# Patient Record
Sex: FEMALE | Race: BLACK OR AFRICAN AMERICAN | Employment: OTHER | URBAN - METROPOLITAN AREA
[De-identification: names, ages, dates, MRNs, and addresses within clinical notes are randomized per-mention and may not be internally consistent; named-entity substitution may affect disease eponyms.]

---

## 2017-04-04 ENCOUNTER — HOSPITAL ENCOUNTER (OUTPATIENT)
Dept: MAMMOGRAPHY | Age: 82
Discharge: HOME | End: 2017-04-04
Attending: INTERNAL MEDICINE
Payer: MEDICARE

## 2017-04-04 DIAGNOSIS — Z12.31 VISIT FOR SCREENING MAMMOGRAM: ICD-10-CM

## 2017-04-04 PROCEDURE — 77067 SCR MAMMO BI INCL CAD: CPT

## 2017-07-20 ENCOUNTER — HOSPITAL ENCOUNTER (OUTPATIENT)
Dept: MRI IMAGING | Age: 82
Discharge: HOME | End: 2017-07-20
Attending: INTERNAL MEDICINE
Payer: MEDICARE

## 2017-07-20 DIAGNOSIS — H53.8 BLURRED VISION: ICD-10-CM

## 2017-07-20 PROCEDURE — 70551 MRI BRAIN STEM W/O DYE: CPT

## 2018-04-17 ENCOUNTER — HOSPITAL ENCOUNTER (OUTPATIENT)
Dept: MAMMOGRAPHY | Age: 83
Discharge: HOME | End: 2018-04-17
Attending: INTERNAL MEDICINE
Payer: MEDICARE

## 2018-04-17 DIAGNOSIS — Z12.39 SCREENING BREAST EXAMINATION: ICD-10-CM

## 2018-04-17 PROCEDURE — 77067 SCR MAMMO BI INCL CAD: CPT

## 2019-01-24 ENCOUNTER — HOSPITAL ENCOUNTER (EMERGENCY)
Age: 84
Discharge: HOME | End: 2019-01-24
Attending: EMERGENCY MEDICINE
Payer: MEDICARE

## 2019-01-24 VITALS
DIASTOLIC BLOOD PRESSURE: 52 MMHG | HEART RATE: 94 BPM | WEIGHT: 172.62 LBS | RESPIRATION RATE: 16 BRPM | OXYGEN SATURATION: 98 % | BODY MASS INDEX: 30.59 KG/M2 | HEIGHT: 63 IN | SYSTOLIC BLOOD PRESSURE: 161 MMHG | TEMPERATURE: 98.2 F

## 2019-01-24 DIAGNOSIS — R04.0 EPISTAXIS: Primary | ICD-10-CM

## 2019-01-24 LAB
APTT PPP: 25.5 SEC (ref 22.1–32)
BASOPHILS # BLD: 0 K/UL (ref 0–0.1)
BASOPHILS NFR BLD: 0 % (ref 0–1)
COMMENT, HOLDF: NORMAL
DIFFERENTIAL METHOD BLD: ABNORMAL
EOSINOPHIL # BLD: 0.1 K/UL (ref 0–0.4)
EOSINOPHIL NFR BLD: 2 % (ref 0–7)
ERYTHROCYTE [DISTWIDTH] IN BLOOD BY AUTOMATED COUNT: 14.3 % (ref 11.5–14.5)
HCT VFR BLD AUTO: 26 % (ref 35–47)
HGB BLD-MCNC: 8.6 G/DL (ref 11.5–16)
IMM GRANULOCYTES # BLD AUTO: 0 K/UL (ref 0–0.04)
IMM GRANULOCYTES NFR BLD AUTO: 1 % (ref 0–0.5)
INR PPP: 1 (ref 0.9–1.1)
LYMPHOCYTES # BLD: 2.1 K/UL (ref 0.8–3.5)
LYMPHOCYTES NFR BLD: 24 % (ref 12–49)
MCH RBC QN AUTO: 31.4 PG (ref 26–34)
MCHC RBC AUTO-ENTMCNC: 33.1 G/DL (ref 30–36.5)
MCV RBC AUTO: 94.9 FL (ref 80–99)
MONOCYTES # BLD: 0.9 K/UL (ref 0–1)
MONOCYTES NFR BLD: 11 % (ref 5–13)
NEUTS SEG # BLD: 5.4 K/UL (ref 1.8–8)
NEUTS SEG NFR BLD: 63 % (ref 32–75)
NRBC # BLD: 0 K/UL (ref 0–0.01)
NRBC BLD-RTO: 0 PER 100 WBC
PLATELET # BLD AUTO: 159 K/UL (ref 150–400)
PMV BLD AUTO: 11.9 FL (ref 8.9–12.9)
PROTHROMBIN TIME: 9.8 SEC (ref 9–11.1)
RBC # BLD AUTO: 2.74 M/UL (ref 3.8–5.2)
SAMPLES BEING HELD,HOLD: NORMAL
THERAPEUTIC RANGE,PTTT: NORMAL SECS (ref 58–77)
WBC # BLD AUTO: 8.6 K/UL (ref 3.6–11)

## 2019-01-24 PROCEDURE — 85610 PROTHROMBIN TIME: CPT

## 2019-01-24 PROCEDURE — 36415 COLL VENOUS BLD VENIPUNCTURE: CPT

## 2019-01-24 PROCEDURE — 85730 THROMBOPLASTIN TIME PARTIAL: CPT

## 2019-01-24 PROCEDURE — 99282 EMERGENCY DEPT VISIT SF MDM: CPT

## 2019-01-24 PROCEDURE — 85025 COMPLETE CBC W/AUTO DIFF WBC: CPT

## 2019-01-24 NOTE — ED PROVIDER NOTES
EMERGENCY DEPARTMENT HISTORY AND PHYSICAL EXAM 
 
 
Date: 1/24/2019 Patient Name: Keith Anthony History of Presenting Illness Chief Complaint Patient presents with  Epistaxis Pt treated last night at [facility] for epistaxis. Pt presents with rhino rocket today and reports earlier bleeding from her mouth and tear ducts. No active bleeding upon arrival.   
 
 
History Provided By: Patient HPI: Keith Anthony, 80 y.o. female with PMHx significant for breast ca, HTN and DM, presents ambulatory to the ED with cc of ongoing bleeding after rhinorocket placed at [facility] yesterday. Patient states that today she noticed so had some bleeding from her mouth and left tear duct and was told by her brother who is an oncologist to come to the emergency department for blood work. Patient has follow up with ENT however since she takes aspirin was told to stop that and come in early next week. Chief Complaint: epistaxis Duration: 1 Days Timing:  Acute Location: nose, mouth, left tear duct Quality: n/a Severity: Mild Modifying Factors: n/a Associated Symptoms: n/a There are no other complaints, changes, or physical findings at this time. PCP: Lea Acriniega MD 
 
No current facility-administered medications on file prior to encounter. Current Outpatient Medications on File Prior to Encounter Medication Sig Dispense Refill  FOLIC ACID/MULTIVITS-MIN/LUT (CENTRUM SILVER PO) Take  by mouth.  cholecalciferol, vitamin D3, (VITAMIN D3) 2,000 unit tab Take  by mouth daily.  HYDROcodone-acetaminophen (NORCO) 5-325 mg per tablet Take 1 tablet by mouth every four (4) hours as needed for Pain. 20 tablet 0  
 aspirin (ASPIRIN) 325 mg tablet Take 1 Tab by mouth daily. 30 Tab 6  
 metformin (GLUCOPHAGE) 500 mg tablet Take  by mouth two (2) times daily (with meals).  Amlodipine-Valsartan-HCTZ (EXFORGE HCT) -25 mg Tab Take 1 Tab by mouth daily.  anastrozole (ARIMIDEX) 1 mg tablet Take 1 mg by mouth daily.  digoxin (LANOXIN) 0.25 mg tablet Take 0.25 mg by mouth daily.  glyBURIDE micronized (GLYNASE) 6 mg tablet Take 6 mg by mouth two (2) times a day (before meals). Past History Past Medical History: 
Past Medical History:  
Diagnosis Date  Breast cancer ([facility] [address] 2006  
 right; also had radiation  [facility]  Hypertension  Ill-defined condition  Stroke ([facility] Past Surgical History: 
Past Surgical History:  
Procedure Laterality Date  HX BREAST BIOPSY Right 2006  
 positive  HX BREAST LUMPECTOMY Right 2006  HX OTHER SURGICAL    
 gallstone treatment Family History: No family history on file. Social History: 
Social History Tobacco Use  Smoking status: Never Smoker Substance Use Topics  Alcohol use: No  
 Drug use: Not on file Allergies: 
No Known Allergies Review of Systems Review of Systems Constitutional: Negative for chills, fever and unexpected weight change. HENT:  
     Epistaxis with rhinorocket in left nare, bleeding from mouth and left tear duct Eyes: Negative for visual disturbance. Respiratory: Negative for apnea, cough, chest tightness, shortness of breath and wheezing. Cardiovascular: Negative for chest pain, palpitations and leg swelling. Gastrointestinal: Negative for abdominal pain, constipation, diarrhea, nausea, rectal pain and vomiting. Endocrine: Negative for polyuria. Genitourinary: Negative for dysuria. Musculoskeletal: Negative for joint swelling. Skin: Negative for rash. Neurological: Negative for dizziness, light-headedness and headaches. Psychiatric/Behavioral: Negative for confusion. Physical Exam  
Physical Exam  
Constitutional: She is oriented to person, place, and time. She appears well-developed and well-nourished. Elderly HENT:  
Head: Normocephalic and atraumatic. Eyes: Conjunctivae and EOM are normal. Pupils are equal, round, and reactive to light. Right eye exhibits no discharge. Left eye exhibits no discharge. No scleral icterus. Neck: Normal range of motion. Cardiovascular: Normal rate and regular rhythm. Murmur heard. Systolic murmur is present with a grade of 4/6. Pulmonary/Chest: Effort normal and breath sounds normal. No respiratory distress. She has no wheezes. She exhibits no tenderness. Abdominal: Soft. She exhibits no distension and no mass. There is no tenderness. Musculoskeletal: She exhibits no edema or deformity. Neurological: She is alert and oriented to person, place, and time. Skin: Skin is warm and dry. No rash noted. No pallor. Psychiatric: She has a normal mood and affect. Diagnostic Study Results Labs - No results found for this or any previous visit (from the past 12 hour(s)). Radiologic Studies - No orders to display CT Results  (Last 48 hours) None CXR Results  (Last 48 hours) None Medical Decision Making I am the first provider for this patient. I reviewed the vital signs, available nursing notes, past medical history, past surgical history, family history and social history. Vital Signs-Reviewed the patient's vital signs. Patient Vitals for the past 12 hrs: 
 Temp Pulse Resp BP SpO2  
01/24/19 1610 98.2 °F (36.8 °C) 94 16 161/52 98 % Pulse Oximetry Analysis - 98% on RA Cardiac Monitor:  
Rate: 94 bpm 
Rhythm: Normal Sinus Rhythm Records Reviewed: Nursing Notes, Old Medical Records and Previous Radiology Studies Provider Notes (Medical Decision Making):  
79 yo F w/ hx of breast ca, DM, HTN here with complaint of ongoing epistaxis and bleeding from left tear duct and mouth. Patient was seen at [facility] doctors yesterday and had rhinorocket placed.  Patient was advised by her brother who is an oncologist to come in for evaluation and blood tests. Will plan to obtain CBC and coags to evaluate patient. Patient to follow up with ENT and has already arranged follow up in their emergency clinic.  
 
ddx includes ongoing epistaxis, thrombocytopenia ED Course:  
Initial assessment performed. The patients presenting problems have been discussed, and they are in agreement with the care plan formulated and outlined with them. I have encouraged them to ask questions as they arise throughout their visit. PROGRESS NOTE: 
7:45 PM 
Labs within normal limits. Will plan for discharge. DISCHARGE NOTE 
7:46 PM 
The patient has been re-evaluated and is ready for discharge. Reviewed available results with patient. Counseled pt on diagnosis and care plan. Pt has expressed understanding, and all questions have been answered. Pt agrees with plan and agrees to F/U as recommended, or return to the ED if their sxs worsen. Discharge instructions have been provided and explained to the pt, along with reasons to return to the ED. Disposition: 
Home PLAN: 
1. Discharge Medication List as of 1/24/2019  7:47 PM  
  
CONTINUE these medications which have NOT CHANGED Details FOLIC ACID/MULTIVITS-MIN/LUT (CENTRUM SILVER PO) Take  by mouth., Historical Med  
  
cholecalciferol, vitamin D3, (VITAMIN D3) 2,000 unit tab Take  by mouth daily. , Historical Med HYDROcodone-acetaminophen (NORCO) 5-325 mg per tablet Take 1 tablet by mouth every four (4) hours as needed for Pain., Print, Disp-20 tablet, R-0  
  
metformin (GLUCOPHAGE) 500 mg tablet Take  by mouth two (2) times daily (with meals). , Historical Med  
  
Amlodipine-Valsartan-HCTZ (EXFORGE HCT) -25 mg Tab Take 1 Tab by mouth daily. , Historical Med  
  
anastrozole (ARIMIDEX) 1 mg tablet Take 1 mg by mouth daily. , Historical Med  
  
digoxin (LANOXIN) 0.25 mg tablet Take 0.25 mg by mouth daily. , Historical Med  
  
glyBURIDE micronized (GLYNASE) 6 mg tablet Take 6 mg by mouth two (2) times a day (before meals). , Historical Med  
  
  
STOP taking these medications  
  
 aspirin (ASPIRIN) 325 mg tablet Comments:  
Reason for Stoppin.  
Follow-up Information Follow up With Specialties Details Why Contact Info Nelson Davenport MD Internal Medicine   [address]  [address] [place] [address] [phone] [facility]  In 5 days for nasal packing removal [address] [place] [id number] Return to ED if worse Diagnosis Clinical Impression: 1. Epistaxis Attestations: 
 
9:49 PM 
I have personally seen and examined the patient, reviewed the resident's note and agree with findings and plan. Saratha Delton Call, MD 
  
The patient presents with:  Epistaxis, L nare, seen yesterday at another ER and has rhino rocket placed. Today no recurrent nose bleed but some blood oozing from tear duct of the L eye. Advised by her family member to come for blood work. My exam shows: rhino rocket in place, no bleeding. Normal eye exam. OP clear Impression/Plan:  Cbc, coags, FU with ENT I have reviewed and agree with the MLP's findings, including all diagnostic interpretations, and plans as written. I was present during the key portions of separately billed procedures.   
Saratha Delton Call, MD

## 2019-05-15 ENCOUNTER — HOSPITAL ENCOUNTER (OUTPATIENT)
Dept: MAMMOGRAPHY | Age: 84
Discharge: HOME | End: 2019-05-15
Attending: INTERNAL MEDICINE
Payer: MEDICARE

## 2019-05-15 DIAGNOSIS — Z12.39 SCREENING BREAST EXAMINATION: ICD-10-CM

## 2019-05-15 PROCEDURE — 77067 SCR MAMMO BI INCL CAD: CPT

## 2019-10-17 LAB — HBA1C MFR BLD HPLC: 6.7 %

## 2019-10-21 ENCOUNTER — OFFICE VISIT (OUTPATIENT)
Dept: CARDIOLOGY CLINIC | Age: 84
End: 2019-10-21

## 2019-10-21 VITALS
HEIGHT: 63 IN | SYSTOLIC BLOOD PRESSURE: 136 MMHG | DIASTOLIC BLOOD PRESSURE: 60 MMHG | BODY MASS INDEX: 29.06 KG/M2 | OXYGEN SATURATION: 97 % | RESPIRATION RATE: 14 BRPM | WEIGHT: 164 LBS | HEART RATE: 80 BPM

## 2019-10-21 DIAGNOSIS — I10 BENIGN ESSENTIAL HTN: ICD-10-CM

## 2019-10-21 DIAGNOSIS — I73.9 CLAUDICATION (HCC): ICD-10-CM

## 2019-10-21 DIAGNOSIS — I45.10 RBBB: ICD-10-CM

## 2019-10-21 DIAGNOSIS — R01.1 CARDIAC MURMUR: Primary | ICD-10-CM

## 2019-10-21 DIAGNOSIS — E11.8 DM TYPE 2, CONTROLLED, WITH COMPLICATION (HCC): ICD-10-CM

## 2019-10-21 RX ORDER — METOPROLOL SUCCINATE 25 MG/1
12.5 TABLET, EXTENDED RELEASE ORAL DAILY
Qty: 90 TAB | Refills: 1 | Status: SHIPPED | OUTPATIENT
Start: 2019-10-21 | End: 2020-10-22

## 2019-10-21 NOTE — LETTER
10/24/2019 5:59 PM 
Patient:  Cathi Ramirez YOB: 1934 Date of Visit: 10/21/2019 Dear [address] [facility] [address] VIA Facsimile: [phone] 
 : Thank you for referring Ms. Cathi Ramirez to me for evaluation/treatment. Below are the relevant portions of my assessment and plan of care. Ms. Rosezena Meckel is an 79 yo F with history of diabetes, essential hypertension, renal insufficiency with creatinine of 1.4, history of breast cancer stable followed by Dr. Mariann Partida, referred by Claudia MONCADA for cardiac evaluation. She also has a history of SVT on Digoxin. She is here due to her Digoxin and renal insufficiency, as well as cardiac murmur that was noted on recent exam.  She says she was told she had a heart murmur for several years, but was told this was okay. She did have an echocardiogram in 2011 that demonstrated preserved LV function with aortic sclerosis, but no stenosis. From a symptom standpoint, she does note she had a mini-stroke a few years ago and had one side of her face that was drooped and slurred speech, but no permanent effects. Overall, her breathing has been okay, though she does get occasional shortness of breath with exertion. She denies any exertional chest pain, but does get \"heartburn\" after eating. She also does note that her feet get cold, sometimes discomfort in her right leg with ambulation. She denies any significant palpitations. No lightheadedness, dizziness or syncope. She is compensated on exam with clear lungs. She does have a III-IV/VI systolic murmur at the left sternal border. Her blood pressure was 136/60 with a heart rate of 80. Her EKG was normal sinus rhythm, right bundle branch block. I reviewed her labs and her creatinine was 1.4. Soc hx. No tobacco 
Fam hx. Son pacemaker needed. No early CAD. Assessment and Plan: 1. Cardiac murmur.   She has a significant murmur and suspect some degree of aortic stenosis. Will obtain an echocardiogram for further evaluation. Depending on the findings, will decide what type of surveillance is needed. 2. Claudication. We will obtain ABIs. 3. Paroxysmal supraventricular tachycardia. She was previously on Digoxin, but her creatinine is elevated and will switch this to Toprol 12.5 mg once daily. Will tentatively have her follow back in one month and reassess. 4. History of TIA. 5. Type 2 diabetes, controlled. 6. Breast cancer. Followed by Dr. Shira Pantoja. 7. Essential hypertension. Blood pressure is controlled. 8. Mixed hyperlipidemia. If you have questions, please do not hesitate to call me. Sincerely, Martita Guevara MD

## 2019-10-21 NOTE — PROGRESS NOTES
[facility] [place]: [address]  [phone]    History of Present Illness:  Ms. Jaqui Peterson is an 81 yo F with history of diabetes, essential hypertension, renal insufficiency with creatinine of 1.4, history of breast cancer stable followed by Dr. Caleb Leon, referred by Jack MONCADA for cardiac evaluation. She also has a history of SVT on Digoxin. She is here due to her Digoxin and renal insufficiency, as well as cardiac murmur that was noted on recent exam.  She says she was told she had a heart murmur for several years, but was told this was okay. She did have an echocardiogram in 2011 that demonstrated preserved LV function with aortic sclerosis, but no stenosis. From a symptom standpoint, she does note she had a mini-stroke a few years ago and had one side of her face that was drooped and slurred speech, but no permanent effects. Overall, her breathing has been okay, though she does get occasional shortness of breath with exertion. She denies any exertional chest pain, but does get \"heartburn\" after eating. She also does note that her feet get cold, sometimes discomfort in her right leg with ambulation. She denies any significant palpitations. No lightheadedness, dizziness or syncope. She is compensated on exam with clear lungs. She does have a III-IV/VI systolic murmur at the left sternal border. Her blood pressure was 136/60 with a heart rate of 80. Her EKG was normal sinus rhythm, right bundle branch block. I reviewed her labs and her creatinine was 1.4. Soc hx. No tobacco  Fam hx. Son pacemaker needed. No early CAD. Assessment and Plan:    1. Cardiac murmur. She has a significant murmur and suspect some degree of aortic stenosis. Will obtain an echocardiogram for further evaluation. Depending on the findings, will decide what type of surveillance is needed. 2. Claudication. We will obtain ABIs. 3. Paroxysmal supraventricular tachycardia.   She was previously on Digoxin, but her creatinine is elevated and will switch this to Toprol 12.5 mg once daily. Will tentatively have her follow back in one month and reassess. 4. History of TIA. 5. Type 2 diabetes, controlled. 6. Breast cancer. Followed by Dr. Luz Marina Schwartz. 7. Essential hypertension. Blood pressure is controlled. 8. Mixed hyperlipidemia. She  has a past medical history of Breast cancer ([facility] [address] (2006), Diabetes ([facility] [address] Hypertension, Ill-defined condition, and Stroke ([facility] [address] All other systems negative except as above. PE  Vitals:    10/21/19 1428   BP: 136/60   Pulse: 80   Resp: 14   SpO2: 97%   Weight: 164 lb (74.4 kg)   Height: 5' 3\" (1.6 m)    Body mass index is 29.05 kg/m².    General appearance - alert, well appearing, and in no distress  Mental status - affect appropriate to mood  Eyes - sclera anicteric, moist mucous membranes  Neck - supple, no JVD  Chest - clear to auscultation, no wheezes, rales or rhonchi  Heart - normal rate, regular rhythm, normal S1, S2, III-IV/VI systolic murmur LUSB  Abdomen - soft, nontender, nondistended, no masses or organomegaly  Neurological -  no focal deficit  Extremities - peripheral pulses normal, no pedal edema      Recent Labs:  Lab Results   Component Value Date/Time    Cholesterol, total 89 02/01/2011 03:29 AM    HDL Cholesterol 35 02/01/2011 03:29 AM    LDL, calculated 21.6 02/01/2011 03:29 AM    Triglyceride 162 (H) 02/01/2011 03:29 AM    CHOL/HDL Ratio 2.5 02/01/2011 03:29 AM     Lab Results   Component Value Date/Time    Creatinine (POC) 1.2 10/19/2013 10:42 AM    Creatinine 1.14 12/01/2014 12:50 AM     Lab Results   Component Value Date/Time    BUN 35 (H) 12/01/2014 12:50 AM     Lab Results   Component Value Date/Time    Potassium 4.2 12/01/2014 12:50 AM     Lab Results   Component Value Date/Time    Hemoglobin A1c 7.1 (H) 01/31/2011 04:25 PM     Lab Results   Component Value Date/Time    HGB 8.6 (L) 01/24/2019 06:53 PM     Lab Results   Component Value Date/Time PLATELET 486 70/21/4760 06:53 PM       Reviewed:  Past Medical History:   Diagnosis Date    Breast cancer ([facility] [address] 2006    right; also had radiation    Diabetes ([facility]     Hypertension     Ill-defined condition     Stroke ([facility]      Social History     Tobacco Use   Smoking Status Never Smoker     Social History     Substance and Sexual Activity   Alcohol Use No     No Known Allergies    Current Outpatient Medications   Medication Sig    FOLIC ACID/MULTIVITS-MIN/LUT (CENTRUM SILVER PO) Take  by mouth.  HYDROcodone-acetaminophen (NORCO) 5-325 mg per tablet Take 1 tablet by mouth every four (4) hours as needed for Pain.  metformin (GLUCOPHAGE) 500 mg tablet Take  by mouth daily (with breakfast).  Amlodipine-Valsartan-HCTZ (EXFORGE HCT) -25 mg Tab Take 1 Tab by mouth daily.  digoxin (LANOXIN) 0.25 mg tablet Take 0.25 mg by mouth daily.  cholecalciferol, vitamin D3, (VITAMIN D3) 2,000 unit tab Take  by mouth daily.  anastrozole (ARIMIDEX) 1 mg tablet Take 1 mg by mouth daily.  glyBURIDE micronized (GLYNASE) 6 mg tablet Take 6 mg by mouth two (2) times a day (before meals). No current facility-administered medications for this visit.         Mario Payan MD  [facility] [place]  [address] [facility] [address]  [address]

## 2019-11-07 ENCOUNTER — TELEPHONE (OUTPATIENT)
Dept: CARDIOLOGY CLINIC | Age: 84
End: 2019-11-07

## 2019-11-07 NOTE — LETTER
11/26/2019 9:45 AM 
 
MsChristina Vanessa Patric Kowalski 
[address] Ms. Cindy Moreau we have attempted several times to reach you via phone. Your NICHOLE was normal. Your echo was normal with mild mitral stenosis so Dr. Katerin Terry recommends to get a repeat Echo and follow up in 1 year. Please call the office to get scheduled for a 1 year follow up and Echo. Thank you

## 2019-11-07 NOTE — TELEPHONE ENCOUNTER
----- Message from George John MD sent at 11/6/2019  4:18 PM EST -----  Please let pt know echo with normal LV function, mild mitral stenosis; recommend repeat echo and f/u in 1 yr for surveillance.  thx  ----- Message -----  From: Steve Rizzo MD  Sent: 11/6/2019   4:18 PM EST  To: George John MD

## 2020-07-02 ENCOUNTER — HOSPITAL ENCOUNTER (OUTPATIENT)
Dept: MAMMOGRAPHY | Age: 85
Discharge: HOME | End: 2020-07-02
Attending: NURSE PRACTITIONER
Payer: MEDICARE

## 2020-07-02 DIAGNOSIS — Z12.31 BREAST CANCER SCREENING BY MAMMOGRAM: ICD-10-CM

## 2020-07-02 PROCEDURE — 77067 SCR MAMMO BI INCL CAD: CPT

## 2020-10-22 RX ORDER — METOPROLOL SUCCINATE 25 MG/1
TABLET, EXTENDED RELEASE ORAL
Qty: 45 TAB | Refills: 0 | Status: SHIPPED | OUTPATIENT
Start: 2020-10-22

## 2020-10-22 NOTE — TELEPHONE ENCOUNTER
Requested Prescriptions     Signed Prescriptions Disp Refills    metoprolol succinate (TOPROL-XL) 25 mg XL tablet 45 Tab 0     Sig: TAKE 1/2 TABLET EVERY DAY     Authorizing Provider: Thomas Bowman     Ordering User: Myles Og       Last office visit 10/21/19    Per Dr. Anthony Og   Date Time Provider [facility] Christianne   11/18/2020 11:40 AM MD NATALIE Lakhani AMB

## 2020-10-26 ENCOUNTER — TELEPHONE (OUTPATIENT)
Dept: CARDIOLOGY CLINIC | Age: 85
End: 2020-10-26

## 2020-10-26 NOTE — TELEPHONE ENCOUNTER
Returned call to patient. Two patient indentifiers verified. Pt was informed that Rx was sent in on Thursday. Pt verbalized understanding and denies any further questions at this time.      Future Appointments   Date Time Provider Daniele Faust   11/18/2020 11:40 AM MD NATALIE Youssef AMB

## 2020-11-18 ENCOUNTER — OFFICE VISIT (OUTPATIENT)
Dept: CARDIOLOGY CLINIC | Age: 85
End: 2020-11-18
Payer: MEDICARE

## 2020-11-18 VITALS
HEART RATE: 82 BPM | HEIGHT: 62 IN | RESPIRATION RATE: 18 BRPM | WEIGHT: 172 LBS | BODY MASS INDEX: 31.65 KG/M2 | OXYGEN SATURATION: 98 % | DIASTOLIC BLOOD PRESSURE: 60 MMHG | SYSTOLIC BLOOD PRESSURE: 120 MMHG

## 2020-11-18 DIAGNOSIS — E11.8 DM TYPE 2, CONTROLLED, WITH COMPLICATION (HCC): ICD-10-CM

## 2020-11-18 DIAGNOSIS — I05.0 MITRAL VALVE STENOSIS, MILD: Primary | ICD-10-CM

## 2020-11-18 DIAGNOSIS — I47.1 PAROXYSMAL SVT (SUPRAVENTRICULAR TACHYCARDIA) (HCC): ICD-10-CM

## 2020-11-18 DIAGNOSIS — I10 BENIGN ESSENTIAL HTN: ICD-10-CM

## 2020-11-18 DIAGNOSIS — E78.2 MIXED HYPERLIPIDEMIA: ICD-10-CM

## 2020-11-18 DIAGNOSIS — R01.1 CARDIAC MURMUR: ICD-10-CM

## 2020-11-18 PROCEDURE — G8510 SCR DEP NEG, NO PLAN REQD: HCPCS | Performed by: INTERNAL MEDICINE

## 2020-11-18 PROCEDURE — 1101F PT FALLS ASSESS-DOCD LE1/YR: CPT | Performed by: INTERNAL MEDICINE

## 2020-11-18 PROCEDURE — G8427 DOCREV CUR MEDS BY ELIG CLIN: HCPCS | Performed by: INTERNAL MEDICINE

## 2020-11-18 PROCEDURE — G8417 CALC BMI ABV UP PARAM F/U: HCPCS | Performed by: INTERNAL MEDICINE

## 2020-11-18 PROCEDURE — 99214 OFFICE O/P EST MOD 30 MIN: CPT | Performed by: INTERNAL MEDICINE

## 2020-11-18 PROCEDURE — G0463 HOSPITAL OUTPT CLINIC VISIT: HCPCS | Performed by: INTERNAL MEDICINE

## 2020-11-18 PROCEDURE — 93005 ELECTROCARDIOGRAM TRACING: CPT | Performed by: INTERNAL MEDICINE

## 2020-11-18 PROCEDURE — 1090F PRES/ABSN URINE INCON ASSESS: CPT | Performed by: INTERNAL MEDICINE

## 2020-11-18 PROCEDURE — G8536 NO DOC ELDER MAL SCRN: HCPCS | Performed by: INTERNAL MEDICINE

## 2020-11-18 PROCEDURE — 93010 ELECTROCARDIOGRAM REPORT: CPT | Performed by: INTERNAL MEDICINE

## 2020-11-18 RX ORDER — HYDROCHLOROTHIAZIDE 25 MG/1
25 TABLET ORAL DAILY
COMMUNITY

## 2020-11-18 RX ORDER — ASPIRIN 81 MG/1
TABLET ORAL DAILY
COMMUNITY

## 2020-11-18 RX ORDER — VALSARTAN 320 MG/1
TABLET ORAL DAILY
COMMUNITY

## 2020-11-18 RX ORDER — LATANOPROST 50 UG/ML
1 SOLUTION/ DROPS OPHTHALMIC
COMMUNITY

## 2020-11-18 RX ORDER — AZELASTINE HCL 205.5 UG/1
SPRAY NASAL 2 TIMES DAILY
COMMUNITY

## 2020-11-18 RX ORDER — AMLODIPINE BESYLATE 10 MG/1
TABLET ORAL DAILY
COMMUNITY

## 2020-11-18 RX ORDER — BETAMETHASONE VALERATE 1.2 MG/G
CREAM TOPICAL 2 TIMES DAILY
COMMUNITY

## 2020-11-18 RX ORDER — BRIMONIDINE TARTRATE 2 MG/ML
1 SOLUTION/ DROPS OPHTHALMIC 3 TIMES DAILY
COMMUNITY

## 2020-11-18 NOTE — LETTER
11/19/2020 11:33 AM 
 
Patient:  Nkechi Cintron YOB: 1934 Date of Visit: 11/18/2020 Dear [address] [address] VIA Facsimile: [phone]: Ms. Ann Marie Peterson is an 79 yo F with history of diabetes, essential hypertension, renal insufficiency with creatinine of 1.4, history of breast cancer stable followed by Dr. Delilah Little, history of SVT on Digoxin. Echo in 2011 demonstrated preserved LV function with aortic sclerosis, but no stenosis. Since her last visit, overall she has been doing okay. She denies any chest pain, shortness of breath, palpitations. She did have one episode of dizziness where she moved suddenly and felt things spinning for 20-30 minutes. This had never happened to her before. It has not recurred, but it sounds like it was a spell of vertigo. She does exercise regularly riding on a stationary bike. She is compensated on exam with clear lungs and no lower extremity edema, III-IV/VI systolic murmur at the left sternal border. Her echocardiogram in 2019 demonstrated normal LV function, but mild mitral stenosis. She also complains of cold feet and she had normal NICHOLE. Soc hx. No tobacco 
Fam hx. Son pacemaker needed. No early CAD. Assessment and Plan: 1. Mitral stenosis. This is to a mild degree and will repeat an echocardiogram for surveillance. Likely needs annual followup for this and will set this up after we get the echocardiogram results. 2. Paroxysmal supraventricular tachycardia. Previously on Digoxin, but her creatinine was elevated and this has been stable on Toprol. No changes made. 3. History of TIA. 4. Type 2 diabetes. Controlled. 5. Breast cancer. Followed by Dr. Delilah Little. 6. Essential hypertension. 7. Mixed hyperlipidemia. If you have questions, please do not hesitate to call me. Sincerely, Loni Espinal MD

## 2020-11-18 NOTE — PROGRESS NOTES
[place] [address]: [facility]  [phone]    History of Present Illness:  Ms. Corby Mendez is an 81 yo F with history of diabetes, essential hypertension, renal insufficiency with creatinine of 1.4, history of breast cancer stable followed by Dr. Teresa Espinal, history of SVT on Digoxin. Echo in 2011 demonstrated preserved LV function with aortic sclerosis, but no stenosis. Since her last visit, overall she has been doing okay. She denies any chest pain, shortness of breath, palpitations. She did have one episode of dizziness where she moved suddenly and felt things spinning for 20-30 minutes. This had never happened to her before. It has not recurred, but it sounds like it was a spell of vertigo. She does exercise regularly riding on a stationary bike. She is compensated on exam with clear lungs and no lower extremity edema, III-IV/VI systolic murmur at the left sternal border. Her echocardiogram in 2019 demonstrated normal LV function, but mild mitral stenosis. She also complains of cold feet and she had normal NICHOLE. Soc hx. No tobacco  Fam hx. Son pacemaker needed. No early CAD. Assessment and Plan:    1. Mitral stenosis. This is to a mild degree and will repeat an echocardiogram for surveillance. Likely needs annual followup for this and will set this up after we get the echocardiogram results. 2. Paroxysmal supraventricular tachycardia. Previously on Digoxin, but her creatinine was elevated and this has been stable on Toprol. No changes made. 3. History of TIA. 4. Type 2 diabetes. Controlled. 5. Breast cancer. Followed by Dr. Teresa Espinal. 6. Essential hypertension. 7. Mixed hyperlipidemia. She  has a past medical history of Breast cancer ([facility] [address] (2006), Diabetes ([facility] [address] Hypertension, Ill-defined condition, and Stroke ([facility] [address] All other systems negative except as above.      PE  Vitals:    11/18/20 1152   BP: 120/60   Pulse: 82   Resp: 18   SpO2: 98%   Weight: 172 lb (78 kg) Height: 5' 2\" (1.575 m)    Body mass index is 31.46 kg/m². General appearance - alert, well appearing, and in no distress  Mental status - affect appropriate to mood  Eyes - sclera anicteric, moist mucous membranes  Neck - supple, no JVD  Chest - clear to auscultation, no wheezes, rales or rhonchi  Heart - normal rate, regular rhythm, normal S1, S2, III-IV/VI systolic murmur LUSB  Abdomen - soft, nontender, nondistended, no masses or organomegaly  Neurological -  no focal deficit  Extremities - peripheral pulses normal, no pedal edema      Recent Labs:  Lab Results   Component Value Date/Time    Cholesterol, total 89 02/01/2011 03:29 AM    HDL Cholesterol 35 02/01/2011 03:29 AM    LDL, calculated 21.6 02/01/2011 03:29 AM    Triglyceride 162 (H) 02/01/2011 03:29 AM    CHOL/HDL Ratio 2.5 02/01/2011 03:29 AM     Lab Results   Component Value Date/Time    Creatinine (POC) 1.2 10/19/2013 10:42 AM    Creatinine 1.14 12/01/2014 12:50 AM     Lab Results   Component Value Date/Time    BUN 35 (H) 12/01/2014 12:50 AM     Lab Results   Component Value Date/Time    Potassium 4.2 12/01/2014 12:50 AM     Lab Results   Component Value Date/Time    Hemoglobin A1c 7.1 (H) 01/31/2011 04:25 PM    Hemoglobin A1c, External 6.7 10/17/2019     Lab Results   Component Value Date/Time    HGB 8.6 (L) 01/24/2019 06:53 PM     Lab Results   Component Value Date/Time    PLATELET 533 58/40/5395 06:53 PM       Reviewed:  Past Medical History:   Diagnosis Date    Breast cancer ([facility] [address] 2006    right; also had radiation    Diabetes ([facility] [address]     Hypertension     Ill-defined condition     Stroke ([facility] [address]      Social History     Tobacco Use   Smoking Status Never Smoker     Social History     Substance and Sexual Activity   Alcohol Use No     No Known Allergies    Current Outpatient Medications   Medication Sig    amLODIPine (NORVASC) 10 mg tablet Take  by mouth daily.  hydroCHLOROthiazide (HYDRODIURIL) 25 mg tablet Take 25 mg by mouth daily.     valsartan (DIOVAN) 320 mg tablet Take  by mouth daily.  aspirin delayed-release 81 mg tablet Take  by mouth daily.  metoprolol succinate (TOPROL-XL) 25 mg XL tablet TAKE 1/2 TABLET EVERY DAY    FOLIC ACID/MULTIVITS-MIN/LUT (CENTRUM SILVER PO) Take  by mouth.  cholecalciferol, vitamin D3, (VITAMIN D3) 2,000 unit tab Take  by mouth daily.  HYDROcodone-acetaminophen (NORCO) 5-325 mg per tablet Take 1 tablet by mouth every four (4) hours as needed for Pain.  metformin (GLUCOPHAGE) 500 mg tablet Take  by mouth daily (with breakfast).  Amlodipine-Valsartan-HCTZ (EXFORGE HCT) -25 mg Tab Take 1 Tab by mouth daily.  anastrozole (ARIMIDEX) 1 mg tablet Take 1 mg by mouth daily.  glyBURIDE micronized (GLYNASE) 6 mg tablet Take 6 mg by mouth two (2) times a day (before meals). No current facility-administered medications for this visit.         Roberta York MD  [facility] [place]  [address] [facility] [address]  [address]

## 2020-11-20 ENCOUNTER — TELEPHONE (OUTPATIENT)
Dept: CARDIOLOGY CLINIC | Age: 85
End: 2020-11-20

## 2020-11-20 NOTE — TELEPHONE ENCOUNTER
Patient is calling to speak with [facility] about her medications. Patient would like to go over which medications she is actively taking. Please advise.     Phone: [phone]

## 2020-11-23 NOTE — TELEPHONE ENCOUNTER
Patient returned call. Two patient indentifiers verified. Pt stated that she wanted to update her medication list to current medications.  Went over medications and updated list with patient and VO Dr. Avi Vasquez.

## 2020-12-11 ENCOUNTER — TELEPHONE (OUTPATIENT)
Dept: CARDIOLOGY CLINIC | Age: 85
End: 2020-12-11

## 2020-12-11 ENCOUNTER — ANCILLARY PROCEDURE (OUTPATIENT)
Dept: CARDIOLOGY CLINIC | Age: 85
End: 2020-12-11
Payer: MEDICARE

## 2020-12-11 VITALS
SYSTOLIC BLOOD PRESSURE: 120 MMHG | HEIGHT: 62 IN | BODY MASS INDEX: 31.65 KG/M2 | DIASTOLIC BLOOD PRESSURE: 60 MMHG | WEIGHT: 172 LBS

## 2020-12-11 DIAGNOSIS — E11.8 DM TYPE 2, CONTROLLED, WITH COMPLICATION (HCC): ICD-10-CM

## 2020-12-11 DIAGNOSIS — E78.2 MIXED HYPERLIPIDEMIA: ICD-10-CM

## 2020-12-11 DIAGNOSIS — I05.0 MITRAL VALVE STENOSIS, MILD: ICD-10-CM

## 2020-12-11 DIAGNOSIS — I10 BENIGN ESSENTIAL HTN: ICD-10-CM

## 2020-12-11 DIAGNOSIS — R01.1 CARDIAC MURMUR: ICD-10-CM

## 2020-12-11 DIAGNOSIS — I47.1 PAROXYSMAL SVT (SUPRAVENTRICULAR TACHYCARDIA) (HCC): ICD-10-CM

## 2020-12-11 LAB
ECHO AO ASC DIAM: 3.15 CM
ECHO AO ROOT DIAM: 3.19 CM
ECHO AV AREA PEAK VELOCITY: 2.48 CM2
ECHO AV AREA VTI: 2.37 CM2
ECHO AV AREA/BSA PEAK VELOCITY: 1.4 CM2/M2
ECHO AV AREA/BSA VTI: 1.3 CM2/M2
ECHO AV MEAN GRADIENT: 7.83 MMHG
ECHO AV PEAK GRADIENT: 12.74 MMHG
ECHO AV PEAK VELOCITY: 178.45 CM/S
ECHO AV VTI: 36.11 CM
ECHO EST RA PRESSURE: 3 MMHG
ECHO LA MAJOR AXIS: 3.75 CM
ECHO LA MINOR AXIS: 2.09 CM
ECHO LV E' LATERAL VELOCITY: 5.03 CM/S
ECHO LV E' SEPTAL VELOCITY: 6.36 CM/S
ECHO LV EDV A4C: 80.22 ML
ECHO LV EDV INDEX A4C: 44.7 ML/M2
ECHO LV EJECTION FRACTION A4C: 58 PERCENT
ECHO LV ESV A4C: 33.96 ML
ECHO LV ESV INDEX A4C: 18.9 ML/M2
ECHO LV INTERNAL DIMENSION DIASTOLIC: 3.86 CM (ref 3.9–5.3)
ECHO LV INTERNAL DIMENSION SYSTOLIC: 1.96 CM
ECHO LV IVSD: 1.52 CM (ref 0.6–0.9)
ECHO LV MASS 2D: 207.5 G (ref 67–162)
ECHO LV MASS INDEX 2D: 115.7 G/M2 (ref 43–95)
ECHO LV POSTERIOR WALL DIASTOLIC: 1.36 CM (ref 0.6–0.9)
ECHO LVOT DIAM: 1.88 CM
ECHO LVOT PEAK GRADIENT: 10.06 MMHG
ECHO LVOT PEAK VELOCITY: 158.6 CM/S
ECHO LVOT SV: 85.8 ML
ECHO LVOT VTI: 30.77 CM
ECHO MV A VELOCITY: 174.44 CM/S
ECHO MV AREA PHT: 1.71 CM2
ECHO MV AREA VTI: 2.24 CM2
ECHO MV E DECELERATION TIME (DT): 444.52 MS
ECHO MV E VELOCITY: 95.95 CM/S
ECHO MV E/A RATIO: 0.55
ECHO MV E/E' LATERAL: 19.08
ECHO MV E/E' RATIO (AVERAGED): 17.08
ECHO MV E/E' SEPTAL: 15.09
ECHO MV MAX VELOCITY: 176.66 CM/S
ECHO MV MEAN GRADIENT: 4.65 MMHG
ECHO MV PEAK GRADIENT: 12.48 MMHG
ECHO MV PRESSURE HALF TIME (PHT): 128.91 MS
ECHO MV VTI: 38.26 CM
ECHO RIGHT VENTRICULAR SYSTOLIC PRESSURE (RVSP): 30.48 MMHG
ECHO TV REGURGITANT MAX VELOCITY: 262.13 CM/S
ECHO TV REGURGITANT PEAK GRADIENT: 27.48 MMHG
LVOT MG: 6.63 MMHG

## 2020-12-11 PROCEDURE — 93306 TTE W/DOPPLER COMPLETE: CPT | Performed by: INTERNAL MEDICINE

## 2020-12-11 NOTE — TELEPHONE ENCOUNTER
----- Message from Nessa Nunez MD sent at 12/11/2020  3:12 PM EST -----  Please let pt know echo was overall unchanged, normal LV function, mild mitral stenosis. Please set up follow up with same day echo in 1 yr.  thx

## 2020-12-11 NOTE — TELEPHONE ENCOUNTER
Called patient. Left message on voicemail requesting call back. Will notify patient of results and schedule 1 yr echo and f/u.

## 2020-12-21 NOTE — TELEPHONE ENCOUNTER
Patient returned call. Two patient indentifiers verified. Pt was informed of results. Pt was scheduled for a follow up. Pt verbalized understanding and denies any further questions at this time.      Future Appointments   Date Time Provider Daniele Faust   11/22/2021  1:00 PM SAVIAT BLANCAS   11/22/2021  1:40 PM MD NATALIE Odonnell AMB

## 2023-05-10 RX ORDER — HYDROCODONE BITARTRATE AND ACETAMINOPHEN 5; 325 MG/1; MG/1
1 TABLET ORAL EVERY 4 HOURS PRN
COMMUNITY
Start: 2014-12-01

## 2023-05-10 RX ORDER — ASPIRIN 81 MG/1
TABLET ORAL DAILY
COMMUNITY

## 2023-05-10 RX ORDER — HYDROCHLOROTHIAZIDE 25 MG/1
25 TABLET ORAL DAILY
COMMUNITY

## 2023-05-10 RX ORDER — VALSARTAN 320 MG/1
TABLET ORAL DAILY
COMMUNITY

## 2023-05-10 RX ORDER — AMLODIPINE BESYLATE 10 MG/1
TABLET ORAL DAILY
COMMUNITY

## 2023-05-10 RX ORDER — BRIMONIDINE TARTRATE 2 MG/ML
1 SOLUTION/ DROPS OPHTHALMIC 3 TIMES DAILY
COMMUNITY

## 2023-05-10 RX ORDER — LATANOPROST 50 UG/ML
1 SOLUTION/ DROPS OPHTHALMIC
COMMUNITY

## 2023-05-10 RX ORDER — METOPROLOL SUCCINATE 25 MG/1
TABLET, EXTENDED RELEASE ORAL
COMMUNITY
Start: 2020-10-22

## 2023-05-10 RX ORDER — AZELASTINE HCL 205.5 UG/1
SPRAY NASAL 2 TIMES DAILY
COMMUNITY